# Patient Record
Sex: FEMALE | Race: WHITE
[De-identification: names, ages, dates, MRNs, and addresses within clinical notes are randomized per-mention and may not be internally consistent; named-entity substitution may affect disease eponyms.]

---

## 2018-10-17 ENCOUNTER — HOSPITAL ENCOUNTER (OUTPATIENT)
Dept: HOSPITAL 18 - NAV RAD | Age: 83
Discharge: HOME | End: 2018-10-17
Attending: INTERNAL MEDICINE
Payer: MEDICARE

## 2018-10-17 DIAGNOSIS — I51.7: ICD-10-CM

## 2018-10-17 DIAGNOSIS — J90: ICD-10-CM

## 2018-10-17 DIAGNOSIS — Z95.2: ICD-10-CM

## 2018-10-17 DIAGNOSIS — I10: Primary | ICD-10-CM

## 2018-10-17 PROCEDURE — 71046 X-RAY EXAM CHEST 2 VIEWS: CPT

## 2018-10-17 NOTE — RAD
RADIOGRAPH CHEST 2 VIEWS:

 

Date: 10/17/18

Time: 1606 HOURS 

 

HISTORY: 

83-year-old female with hypertension. 

 

COMPARISON: 

07/11/18. 

 

FINDINGS:

Sternotomy wires. The previously demonstrated left pleural effusion that was occupying approximately 
50% volume of left hemithorax, has decreased to approximately 20-30% volume of the left hemithorax. A
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 The left upper lobe and the visualized portions of the ri
ght lung appear to be grossly clear. Borderline or mild cardiomegaly. No right-sided pleural effusion
. No pneumothorax. There is no pulmonary edema. 

 

 

IMPRESSION:

1.  Interval decrease in volume of the left pleural effusion. Currently, there is a small to moderate
 size left pleural effusion which obscures the left base. 

 

2.  Mild cardiomegaly. 

 

3.  Prosthetic cardiac valve. 

 

 

 

 

JN [] 

 

POS: [facility]

## 2022-01-12 ENCOUNTER — HOSPITAL ENCOUNTER (OUTPATIENT)
Dept: HOSPITAL 92 - ULT | Age: 87
Discharge: HOME | End: 2022-01-12
Payer: MEDICARE

## 2022-01-12 DIAGNOSIS — R06.02: ICD-10-CM

## 2022-01-12 DIAGNOSIS — M79.604: ICD-10-CM

## 2022-01-12 DIAGNOSIS — M70.41: ICD-10-CM

## 2022-01-12 DIAGNOSIS — M79.89: Primary | ICD-10-CM

## 2025-01-06 ENCOUNTER — HOSPITAL ENCOUNTER (OUTPATIENT)
Dept: HOSPITAL 92 - LABBT | Age: OVER 89
Discharge: HOME | End: 2025-01-06
Attending: INTERNAL MEDICINE
Payer: MEDICARE

## 2025-01-06 DIAGNOSIS — Z01.812: Primary | ICD-10-CM

## 2025-01-06 DIAGNOSIS — I35.0: ICD-10-CM

## 2025-01-06 LAB
ALBUMIN SERPL BCG-MCNC: 3.9 G/DL (ref 3.4–4.8)
ALP SERPL-CCNC: 73 U/L (ref 40–110)
ALT SERPL W P-5'-P-CCNC: 29 U/L (ref 8–55)
ANION GAP SERPL CALC-SCNC: 13 MMOL/L (ref 10–20)
AST SERPL-CCNC: 35 U/L (ref 5–34)
BASOPHILS # BLD AUTO: 0.03 10X3/UL (ref 0–0.2)
BASOPHILS NFR BLD AUTO: 0.5 % (ref 0–1)
BILIRUB SERPL-MCNC: 0.6 MG/DL (ref 0.2–1.2)
BUN SERPL-MCNC: 25 MG/DL (ref 9.8–20.1)
CALCIUM SERPL-MCNC: 9.7 MG/DL (ref 7.8–10.44)
CHLORIDE SERPL-SCNC: 105 MMOL/L (ref 98–107)
CO2 SERPL-SCNC: 28 MMOL/L (ref 23–31)
CREAT CL PREDICTED SERPL C-G-VRATE: 0 ML/MIN (ref 70–130)
EOSINOPHIL # BLD AUTO: 0.2 10X3/UL (ref 0–0.7)
EOSINOPHIL NFR BLD AUTO: 3.9 % (ref 0–10)
GLOBULIN SER CALC-MCNC: 3.7 G/DL (ref 2.4–3.5)
GLUCOSE SERPL-MCNC: 82 MG/DL (ref 83–110)
HCT VFR BLD CALC: 39.9 % (ref 36–47)
HGB BLD-MCNC: 13.3 G/DL (ref 12–16)
LYMPHOCYTES NFR BLD AUTO: 39.1 % (ref 21–51)
MCH RBC QN AUTO: 33 PG (ref 27–31)
MCV RBC AUTO: 99 FL (ref 78–98)
MONOCYTES # BLD AUTO: 0.5 10X3/UL (ref 0.11–0.59)
MONOCYTES NFR BLD AUTO: 8.7 % (ref 0–10)
NEUTROPHILS # BLD AUTO: 2.7 10X3/UL (ref 1.4–6.5)
NEUTROPHILS NFR BLD AUTO: 47.6 % (ref 42–75)
PLATELET # BLD AUTO: 178 10X3/UL (ref 130–400)
POTASSIUM SERPL-SCNC: 4.2 MMOL/L (ref 3.5–5.1)
RBC # BLD AUTO: 4.03 MILL/UL (ref 4.2–5.4)
SODIUM SERPL-SCNC: 142 MMOL/L (ref 136–145)
WBC # BLD AUTO: 5.6 10X3/UL (ref 4.8–10.8)

## 2025-01-06 PROCEDURE — 85025 COMPLETE CBC W/AUTO DIFF WBC: CPT

## 2025-01-06 PROCEDURE — 80053 COMPREHEN METABOLIC PANEL: CPT

## 2025-01-10 ENCOUNTER — HOSPITAL ENCOUNTER (OUTPATIENT)
Dept: HOSPITAL 92 - CCL | Age: OVER 89
Discharge: HOME | End: 2025-01-10
Attending: INTERNAL MEDICINE
Payer: MEDICARE

## 2025-01-10 VITALS — BODY MASS INDEX: 23.8 KG/M2

## 2025-01-10 DIAGNOSIS — Z95.1: ICD-10-CM

## 2025-01-10 DIAGNOSIS — I35.0: Primary | ICD-10-CM

## 2025-01-10 DIAGNOSIS — I45.10: ICD-10-CM

## 2025-01-10 DIAGNOSIS — Z90.89: ICD-10-CM

## 2025-01-10 DIAGNOSIS — I25.10: ICD-10-CM

## 2025-01-10 DIAGNOSIS — I10: ICD-10-CM

## 2025-01-10 DIAGNOSIS — M19.90: ICD-10-CM

## 2025-01-10 DIAGNOSIS — Z79.899: ICD-10-CM

## 2025-01-10 DIAGNOSIS — F60.3: ICD-10-CM

## 2025-01-10 DIAGNOSIS — Z79.82: ICD-10-CM

## 2025-01-10 PROCEDURE — 93461 R&L HRT ART/VENTRICLE ANGIO: CPT

## 2025-01-10 PROCEDURE — C1769 GUIDE WIRE: HCPCS

## 2025-01-10 PROCEDURE — 99153 MOD SED SAME PHYS/QHP EA: CPT

## 2025-01-10 PROCEDURE — 93460 R&L HRT ART/VENTRICLE ANGIO: CPT

## 2025-01-10 PROCEDURE — C1751 CATH, INF, PER/CENT/MIDLINE: HCPCS

## 2025-01-10 PROCEDURE — 4A023N8 MEASUREMENT OF CARDIAC SAMPLING AND PRESSURE, BILATERAL, PERCUTANEOUS APPROACH: ICD-10-PCS | Performed by: INTERNAL MEDICINE

## 2025-01-10 PROCEDURE — 99152 MOD SED SAME PHYS/QHP 5/>YRS: CPT

## 2025-01-10 PROCEDURE — C1894 INTRO/SHEATH, NON-LASER: HCPCS
